# Patient Record
Sex: FEMALE | Race: WHITE | NOT HISPANIC OR LATINO | Employment: FULL TIME | URBAN - NONMETROPOLITAN AREA
[De-identification: names, ages, dates, MRNs, and addresses within clinical notes are randomized per-mention and may not be internally consistent; named-entity substitution may affect disease eponyms.]

---

## 2018-01-25 PROCEDURE — G0123 SCREEN CERV/VAG THIN LAYER: HCPCS | Performed by: OBSTETRICS & GYNECOLOGY

## 2018-01-26 ENCOUNTER — LAB REQUISITION (OUTPATIENT)
Dept: LAB | Facility: HOSPITAL | Age: 48
End: 2018-01-26

## 2018-01-26 DIAGNOSIS — Z12.72 ENCOUNTER FOR SCREENING FOR MALIGNANT NEOPLASM OF VAGINA: ICD-10-CM

## 2018-06-06 LAB
GEN CATEG CVX/VAG CYTO-IMP: NORMAL
LAB AP CASE REPORT: NORMAL
LAB AP GYN ADDITIONAL INFORMATION: NORMAL
LAB AP GYN OTHER FINDINGS: NORMAL
Lab: NORMAL
PATH INTERP SPEC-IMP: NORMAL
STAT OF ADQ CVX/VAG CYTO-IMP: NORMAL

## 2019-04-26 ENCOUNTER — PREP FOR SURGERY (OUTPATIENT)
Dept: OTHER | Facility: HOSPITAL | Age: 49
End: 2019-04-26

## 2019-04-26 DIAGNOSIS — N92.0 MENORRHAGIA WITH REGULAR CYCLE: Primary | ICD-10-CM

## 2019-04-26 RX ORDER — SODIUM CHLORIDE 0.9 % (FLUSH) 0.9 %
3-10 SYRINGE (ML) INJECTION AS NEEDED
Status: CANCELLED | OUTPATIENT
Start: 2019-04-26

## 2019-04-26 RX ORDER — SODIUM CHLORIDE 0.9 % (FLUSH) 0.9 %
3 SYRINGE (ML) INJECTION EVERY 12 HOURS SCHEDULED
Status: CANCELLED | OUTPATIENT
Start: 2019-04-26

## 2019-04-26 NOTE — H&P
History and Physical    Jazmin Najera  1970  [id number]    Chief complaint: abnormal menses. Menses are regular but are lasting 10+ days with clots and worsening dysmenorrhea    Subjective     Patient is a 49 y.o. female , last menstrual period was 2019, who presents with menorrhagia and abnormal ultrasound.    Her previous obstetric/gynecological history is noted for is non-contributory.         Allergies: NKA  Medication: nexium        Review of Systems   Constitutional: Negative.    HENT: Negative.    Eyes: Negative.    Respiratory: Negative.    Cardiovascular: Negative.    Gastrointestinal: Negative.    Endocrine: Negative.    Genitourinary: Positive for menstrual problem.   Musculoskeletal: Negative.    Skin: Negative.    Allergic/Immunologic: Negative.    Neurological: Negative.    Hematological: Negative.    Psychiatric/Behavioral: Negative.           Otherwise complete ROS reviewed and negative except as mentioned in the HPI.      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 and borderline AODM    Past Surgical History:  This patient has no significant past surgical history    Social History:  Marital Status:                                Smoker:  Never smoker                             Illicit Drugs:  Illicit drug use:  none                             Alcohol:  Alcohol use: none                              Family History:   Non-Contributory        Objective     Vital Signs Range for the last 24 hours  Temperature:98.6     Temp Source:oral     BP:126/78    Pulse:72    Respirations:18     SPO2:     O2 Amount (l/min):     O2 Devices     Weight:233         Physical Examination    General appearance:  alert, , well appearing, and in no distress, oriented to person, place, and time  Mental Status:  alert, oriented to person, place, and time  Eyes:  pupils equal and reactive to light  Ears:  bilateral TM's and external ear canals normal  Nose:  normal and patent, no erythema,  discharge or polyps  Mouth:  mucous membranes moist, pharynx normal without lesions  Neck:  supple, no significant adenopathy  Lymphatics:  no palpable lymphadenopathy, no hepatosplenomegaly  Chest:  clear to auscultation, no wheezes, rales or rhonchi, symmetric air entry  Heart::  normal rate, regular rhythm, normal S1, S2, no murmurs, rubs, clicks or gallops  Abdomen:  soft, nontender, nondistended, no masses or organomegaly  Breasts:  breasts appear normal, no suspicious masses, no skin or nipple changes or axillary nodes  Pelvic:  normal external genitalia, vulva, vagina, cervix, uterus and adnexa, UTERUS: uterus is normal size, shape, consistency and nontender, enlarged to 9 week's size, soft and boggy  Rectal:  normal rectal, no masses  Back exam:  full range of motion, no tenderness, palpable spasm or pain on motion  Neurological:  alert, oriented, normal speech, no focal findings or movement disorder noted  Musculoskeletal:  no joint tenderness, deformity or swelling  Extremities:   peripheral pulses normal, no pedal edema, no clubbing or cyanosis  Skin:  normal coloration and turgor, no rashes, no suspicious skin lesions noted          Laboratory Results: Reviewed  Radiology Review: Yes    Lab Results (last 72 hours)     ** No results found for the last 72 hours. **            Assessment/Plan       Assessment:  1.menorrhagia with an abnormal ulytrasound    Plan:  1. D&C        Amarjit Cardenas MD  4/26/2019  1:07 PM

## 2019-05-01 RX ORDER — OMEPRAZOLE 40 MG/1
40 CAPSULE, DELAYED RELEASE ORAL DAILY
COMMUNITY

## 2019-05-03 ENCOUNTER — ANESTHESIA (OUTPATIENT)
Dept: PERIOP | Facility: HOSPITAL | Age: 49
End: 2019-05-03

## 2019-05-03 ENCOUNTER — ANESTHESIA EVENT (OUTPATIENT)
Dept: PERIOP | Facility: HOSPITAL | Age: 49
End: 2019-05-03

## 2019-05-03 ENCOUNTER — HOSPITAL ENCOUNTER (OUTPATIENT)
Facility: HOSPITAL | Age: 49
Discharge: HOME | End: 2019-05-03
Attending: OBSTETRICS & GYNECOLOGY | Admitting: OBSTETRICS & GYNECOLOGY

## 2019-05-03 VITALS
HEART RATE: 63 BPM | SYSTOLIC BLOOD PRESSURE: 106 MMHG | BODY MASS INDEX: 39.71 KG/M2 | OXYGEN SATURATION: 97 % | DIASTOLIC BLOOD PRESSURE: 62 MMHG | RESPIRATION RATE: 16 BRPM | HEIGHT: 65 IN | WEIGHT: 238.32 LBS | TEMPERATURE: 97.9 F

## 2019-05-03 DIAGNOSIS — N92.0 MENORRHAGIA WITH REGULAR CYCLE: ICD-10-CM

## 2019-05-03 DIAGNOSIS — N92.0 MENORRHAGIA: ICD-10-CM

## 2019-05-03 PROBLEM — N84.0 UTERINE POLYP: Status: ACTIVE | Noted: 2019-05-03

## 2019-05-03 LAB — B-HCG UR QL: NEGATIVE

## 2019-05-03 PROCEDURE — 25010000002 DEXAMETHASONE PER 1 MG: Performed by: ANESTHESIOLOGY

## 2019-05-03 PROCEDURE — 25010000002 PROPOFOL 10 MG/ML EMULSION: Performed by: NURSE ANESTHETIST, CERTIFIED REGISTERED

## 2019-05-03 PROCEDURE — 25010000002 MIDAZOLAM PER 1 MG: Performed by: ANESTHESIOLOGY

## 2019-05-03 PROCEDURE — 25010000002 FENTANYL CITRATE (PF) 100 MCG/2ML SOLUTION: Performed by: NURSE ANESTHETIST, CERTIFIED REGISTERED

## 2019-05-03 PROCEDURE — 88305 TISSUE EXAM BY PATHOLOGIST: CPT | Performed by: OBSTETRICS & GYNECOLOGY

## 2019-05-03 PROCEDURE — 25010000002 DEXAMETHASONE PER 1 MG: Performed by: NURSE ANESTHETIST, CERTIFIED REGISTERED

## 2019-05-03 PROCEDURE — 25010000002 ONDANSETRON PER 1 MG: Performed by: NURSE ANESTHETIST, CERTIFIED REGISTERED

## 2019-05-03 PROCEDURE — 81025 URINE PREGNANCY TEST: CPT | Performed by: OBSTETRICS & GYNECOLOGY

## 2019-05-03 RX ORDER — ONDANSETRON 2 MG/ML
INJECTION INTRAMUSCULAR; INTRAVENOUS AS NEEDED
Status: DISCONTINUED | OUTPATIENT
Start: 2019-05-03 | End: 2019-05-03 | Stop reason: SURG

## 2019-05-03 RX ORDER — OXYCODONE AND ACETAMINOPHEN 10; 325 MG/1; MG/1
1 TABLET ORAL ONCE AS NEEDED
Status: COMPLETED | OUTPATIENT
Start: 2019-05-03 | End: 2019-05-03

## 2019-05-03 RX ORDER — PROPOFOL 10 MG/ML
VIAL (ML) INTRAVENOUS AS NEEDED
Status: DISCONTINUED | OUTPATIENT
Start: 2019-05-03 | End: 2019-05-03 | Stop reason: SURG

## 2019-05-03 RX ORDER — DEXTROSE MONOHYDRATE 25 G/50ML
12.5 INJECTION, SOLUTION INTRAVENOUS AS NEEDED
Status: DISCONTINUED | OUTPATIENT
Start: 2019-05-03 | End: 2019-05-03 | Stop reason: HOSPADM

## 2019-05-03 RX ORDER — LIDOCAINE HYDROCHLORIDE 20 MG/ML
INJECTION, SOLUTION INFILTRATION; PERINEURAL AS NEEDED
Status: DISCONTINUED | OUTPATIENT
Start: 2019-05-03 | End: 2019-05-03 | Stop reason: SURG

## 2019-05-03 RX ORDER — DEXAMETHASONE SODIUM PHOSPHATE 4 MG/ML
4 INJECTION, SOLUTION INTRA-ARTICULAR; INTRALESIONAL; INTRAMUSCULAR; INTRAVENOUS; SOFT TISSUE ONCE AS NEEDED
Status: COMPLETED | OUTPATIENT
Start: 2019-05-03 | End: 2019-05-03

## 2019-05-03 RX ORDER — SODIUM CHLORIDE 0.9 % (FLUSH) 0.9 %
3-10 SYRINGE (ML) INJECTION AS NEEDED
Status: DISCONTINUED | OUTPATIENT
Start: 2019-05-03 | End: 2019-05-03 | Stop reason: HOSPADM

## 2019-05-03 RX ORDER — FAMOTIDINE 10 MG/ML
20 INJECTION, SOLUTION INTRAVENOUS
Status: DISCONTINUED | OUTPATIENT
Start: 2019-05-03 | End: 2019-05-03 | Stop reason: HOSPADM

## 2019-05-03 RX ORDER — IBUPROFEN 600 MG/1
600 TABLET ORAL EVERY 6 HOURS PRN
Qty: 30 TABLET | Refills: 0 | Status: SHIPPED | OUTPATIENT
Start: 2019-05-03

## 2019-05-03 RX ORDER — MIDAZOLAM HYDROCHLORIDE 1 MG/ML
2 INJECTION INTRAMUSCULAR; INTRAVENOUS
Status: DISCONTINUED | OUTPATIENT
Start: 2019-05-03 | End: 2019-05-03 | Stop reason: HOSPADM

## 2019-05-03 RX ORDER — SODIUM CHLORIDE 0.9 % (FLUSH) 0.9 %
1-10 SYRINGE (ML) INJECTION AS NEEDED
Status: DISCONTINUED | OUTPATIENT
Start: 2019-05-03 | End: 2019-05-03 | Stop reason: HOSPADM

## 2019-05-03 RX ORDER — METOCLOPRAMIDE HYDROCHLORIDE 5 MG/ML
5 INJECTION INTRAMUSCULAR; INTRAVENOUS
Status: DISCONTINUED | OUTPATIENT
Start: 2019-05-03 | End: 2019-05-03 | Stop reason: HOSPADM

## 2019-05-03 RX ORDER — SCOLOPAMINE TRANSDERMAL SYSTEM 1 MG/1
1 PATCH, EXTENDED RELEASE TRANSDERMAL CONTINUOUS
Status: DISCONTINUED | OUTPATIENT
Start: 2019-05-03 | End: 2019-05-03 | Stop reason: HOSPADM

## 2019-05-03 RX ORDER — LABETALOL HYDROCHLORIDE 5 MG/ML
5 INJECTION, SOLUTION INTRAVENOUS
Status: DISCONTINUED | OUTPATIENT
Start: 2019-05-03 | End: 2019-05-03 | Stop reason: HOSPADM

## 2019-05-03 RX ORDER — MEPERIDINE HYDROCHLORIDE 25 MG/ML
12.5 INJECTION INTRAMUSCULAR; INTRAVENOUS; SUBCUTANEOUS
Status: DISCONTINUED | OUTPATIENT
Start: 2019-05-03 | End: 2019-05-03 | Stop reason: HOSPADM

## 2019-05-03 RX ORDER — DEXAMETHASONE SODIUM PHOSPHATE 4 MG/ML
INJECTION, SOLUTION INTRA-ARTICULAR; INTRALESIONAL; INTRAMUSCULAR; INTRAVENOUS; SOFT TISSUE AS NEEDED
Status: DISCONTINUED | OUTPATIENT
Start: 2019-05-03 | End: 2019-05-03 | Stop reason: SURG

## 2019-05-03 RX ORDER — IBUPROFEN 600 MG/1
600 TABLET ORAL ONCE AS NEEDED
Status: DISCONTINUED | OUTPATIENT
Start: 2019-05-03 | End: 2019-05-03 | Stop reason: HOSPADM

## 2019-05-03 RX ORDER — SODIUM CHLORIDE 0.9 % (FLUSH) 0.9 %
3 SYRINGE (ML) INJECTION EVERY 12 HOURS SCHEDULED
Status: DISCONTINUED | OUTPATIENT
Start: 2019-05-03 | End: 2019-05-03 | Stop reason: HOSPADM

## 2019-05-03 RX ORDER — ONDANSETRON 2 MG/ML
4 INJECTION INTRAMUSCULAR; INTRAVENOUS ONCE AS NEEDED
Status: DISCONTINUED | OUTPATIENT
Start: 2019-05-03 | End: 2019-05-03 | Stop reason: HOSPADM

## 2019-05-03 RX ORDER — FENTANYL CITRATE 50 UG/ML
25 INJECTION, SOLUTION INTRAMUSCULAR; INTRAVENOUS
Status: DISCONTINUED | OUTPATIENT
Start: 2019-05-03 | End: 2019-05-03 | Stop reason: HOSPADM

## 2019-05-03 RX ORDER — MIDAZOLAM HYDROCHLORIDE 1 MG/ML
1 INJECTION INTRAMUSCULAR; INTRAVENOUS
Status: DISCONTINUED | OUTPATIENT
Start: 2019-05-03 | End: 2019-05-03 | Stop reason: HOSPADM

## 2019-05-03 RX ORDER — IPRATROPIUM BROMIDE AND ALBUTEROL SULFATE 2.5; .5 MG/3ML; MG/3ML
3 SOLUTION RESPIRATORY (INHALATION) ONCE AS NEEDED
Status: DISCONTINUED | OUTPATIENT
Start: 2019-05-03 | End: 2019-05-03 | Stop reason: HOSPADM

## 2019-05-03 RX ORDER — FENTANYL CITRATE 50 UG/ML
25 INJECTION, SOLUTION INTRAMUSCULAR; INTRAVENOUS AS NEEDED
Status: DISCONTINUED | OUTPATIENT
Start: 2019-05-03 | End: 2019-05-03 | Stop reason: HOSPADM

## 2019-05-03 RX ORDER — SODIUM CHLORIDE, SODIUM LACTATE, POTASSIUM CHLORIDE, CALCIUM CHLORIDE 600; 310; 30; 20 MG/100ML; MG/100ML; MG/100ML; MG/100ML
1000 INJECTION, SOLUTION INTRAVENOUS CONTINUOUS
Status: DISCONTINUED | OUTPATIENT
Start: 2019-05-03 | End: 2019-05-03 | Stop reason: HOSPADM

## 2019-05-03 RX ORDER — FENTANYL CITRATE 50 UG/ML
INJECTION, SOLUTION INTRAMUSCULAR; INTRAVENOUS AS NEEDED
Status: DISCONTINUED | OUTPATIENT
Start: 2019-05-03 | End: 2019-05-03 | Stop reason: SURG

## 2019-05-03 RX ORDER — SODIUM CHLORIDE, SODIUM LACTATE, POTASSIUM CHLORIDE, CALCIUM CHLORIDE 600; 310; 30; 20 MG/100ML; MG/100ML; MG/100ML; MG/100ML
9 INJECTION, SOLUTION INTRAVENOUS CONTINUOUS
Status: DISCONTINUED | OUTPATIENT
Start: 2019-05-03 | End: 2019-05-03 | Stop reason: HOSPADM

## 2019-05-03 RX ORDER — OXYCODONE AND ACETAMINOPHEN 7.5; 325 MG/1; MG/1
2 TABLET ORAL EVERY 4 HOURS PRN
Status: DISCONTINUED | OUTPATIENT
Start: 2019-05-03 | End: 2019-05-03 | Stop reason: HOSPADM

## 2019-05-03 RX ORDER — NALOXONE HCL 0.4 MG/ML
0.4 VIAL (ML) INJECTION AS NEEDED
Status: DISCONTINUED | OUTPATIENT
Start: 2019-05-03 | End: 2019-05-03 | Stop reason: HOSPADM

## 2019-05-03 RX ADMIN — PROPOFOL 300 MG: 10 INJECTION, EMULSION INTRAVENOUS at 07:07

## 2019-05-03 RX ADMIN — SODIUM CHLORIDE, POTASSIUM CHLORIDE, SODIUM LACTATE AND CALCIUM CHLORIDE 1000 ML: 600; 310; 30; 20 INJECTION, SOLUTION INTRAVENOUS at 06:02

## 2019-05-03 RX ADMIN — LIDOCAINE HYDROCHLORIDE 100 MG: 20 INJECTION, SOLUTION INFILTRATION; PERINEURAL at 07:07

## 2019-05-03 RX ADMIN — DEXAMETHASONE SODIUM PHOSPHATE 4 MG: 4 INJECTION, SOLUTION INTRAMUSCULAR; INTRAVENOUS at 07:15

## 2019-05-03 RX ADMIN — DEXAMETHASONE SODIUM PHOSPHATE 4 MG: 4 INJECTION, SOLUTION INTRAMUSCULAR; INTRAVENOUS at 06:36

## 2019-05-03 RX ADMIN — FAMOTIDINE 20 MG: 10 INJECTION, SOLUTION INTRAVENOUS at 06:36

## 2019-05-03 RX ADMIN — ONDANSETRON HYDROCHLORIDE 4 MG: 2 SOLUTION INTRAMUSCULAR; INTRAVENOUS at 07:15

## 2019-05-03 RX ADMIN — SCOPALAMINE 1 PATCH: 1 PATCH, EXTENDED RELEASE TRANSDERMAL at 06:36

## 2019-05-03 RX ADMIN — FENTANYL CITRATE 100 MCG: 50 INJECTION, SOLUTION INTRAMUSCULAR; INTRAVENOUS at 07:08

## 2019-05-03 RX ADMIN — OXYCODONE HYDROCHLORIDE AND ACETAMINOPHEN 1 TABLET: 10; 325 TABLET ORAL at 07:54

## 2019-05-03 RX ADMIN — PROPOFOL 100 MG: 10 INJECTION, EMULSION INTRAVENOUS at 07:09

## 2019-05-03 RX ADMIN — MIDAZOLAM 2 MG: 1 INJECTION INTRAMUSCULAR; INTRAVENOUS at 06:43

## 2019-05-03 NOTE — ANESTHESIA PREPROCEDURE EVALUATION
Anesthesia Evaluation     Patient summary reviewed   NPO Solid Status: > 8 hours             Airway   Mallampati: II  TM distance: >3 FB  Neck ROM: full  Dental      Pulmonary    (-) COPD, asthma, sleep apnea, not a smoker  Cardiovascular   Exercise tolerance: excellent (>7 METS)    (-) pacemaker, past MI, angina, cardiac stents      Neuro/Psych  (-) seizures, TIA, CVA  GI/Hepatic/Renal/Endo    (+) morbid obesity, GERD,    (-) liver disease, no renal disease, diabetes    Musculoskeletal     Abdominal    Substance History      OB/GYN          Other                        Anesthesia Plan    ASA 3     general     intravenous induction   Anesthetic plan, all risks, benefits, and alternatives have been provided, discussed and informed consent has been obtained with: patient.

## 2019-05-03 NOTE — ANESTHESIA POSTPROCEDURE EVALUATION
"Patient: Jazmin Najera    Procedure Summary     Date:  05/03/19 Room / Location:   [facility]  /  [facility]    Anesthesia Start:  0706 Anesthesia Stop:  0737    Procedure:  DILATION  AND CURETTAGE (N/A Vagina) Diagnosis:  (MENORRHAGIA)    Surgeon:  Amarjit Cardenas MD Provider:  JOURDAN Quinn CRNA    Anesthesia Type:  general ASA Status:  3          Anesthesia Type: general  Last vitals  BP   109/71 (05/03/19 0815)   Temp   97.9 °F (36.6 °C) (05/03/19 0800)   Pulse   69 (05/03/19 0815)   Resp   16 (05/03/19 0815)     SpO2   99 % (05/03/19 0815)     Post Anesthesia Care and Evaluation    Patient location during evaluation: PACU  Patient participation: complete - patient participated  Level of consciousness: awake and alert  Pain management: adequate  Airway patency: patent  Anesthetic complications: No anesthetic complications  PONV Status: none  Cardiovascular status: acceptable and hemodynamically stable  Respiratory status: acceptable  Hydration status: acceptable    Comments: Blood pressure 109/71, pulse 69, temperature 97.9 °F (36.6 °C), resp. rate 16, height 165 cm (64.96\"), weight 108 kg (238 lb 5.1 oz), last menstrual period 04/08/2019, SpO2 99 %, not currently breastfeeding.    Patient discharged from PACU based upon Ivonne score. Please see RN notes for further details      "

## 2019-05-03 NOTE — ANESTHESIA PROCEDURE NOTES
Airway  Urgency: elective    Airway not difficult    General Information and Staff    Patient location during procedure: OR  CRNA: JOURDAN Quinn CRNA    Indications and Patient Condition  Indications for airway management: airway protection    Preoxygenated: yes  MILS maintained throughout  Mask difficulty assessment: 1 - vent by mask    Final Airway Details  Final airway type: endotracheal airway      Successful airway: ETT  Cuffed: yes   Successful intubation technique: direct laryngoscopy  Facilitating devices/methods: intubating stylet  Endotracheal tube insertion site: oral  Blade: Siegel  Blade size: 3  ETT size (mm): 7.5  Cormack-Lehane Classification: grade I - full view of glottis  Placement verified by: chest auscultation and capnometry   Cuff volume (mL): 5  Measured from: lips  ETT to lips (cm): 21  Number of attempts at approach: 1

## 2019-05-03 NOTE — OP NOTE
Monique Najera  : 1970  MRN: [id number]  CSN: [id number]  Date: 5/3/2019    Operative Note    DILATATION AND CURETTAGE      Pre-op Diagnosis:  MENORRHAGIA   Post-op Diagnosis:  Same pending pathology.    Procedure: Procedure(s):  DILATION  AND CURETTAGE   Surgeon: Surgeon(s):  Amarjit Cardenas MD      Anesthesia: General.     Description of Procedure: The patient was taken to the operating room table and placed in the supine position. After satisfactory level of general anesthesia was obtained, the patient was placed in the dorsal lithotomy position, prepped and draped in a sterile manner. The patient was catheterized. Examination under anesthesia was performed. Exam revealed excellent descensus with a 2nd-3rd degree rectocele present. A weighted speculum was introduced into the vaginal vault. The cervix was grasped with a single-tooth tenaculum, sounded to 8 cm, and dilated to a #6 with Hegar dilators.A fractional curettage was then performed.Curettings of the endocervix reveiled a scant amount of normal appearing tissue. The uterus was then curettaged. Curettings revealed a moderate amount of normal-appearing endometrial tissue. A polyp was identified and removed. A small uterine septum was also noted, both uterine horns were curetted.  Ky stone polyp forceps was used to remove the rest of the tissue.  Tenaculum sites were noted to be dry. Vagina and cervix was inspected and noted to be hemostatically dry. Weighted speculum was removed. The patient was replaced in the supine position. The procedure was terminated. At termination of the case, pad and lap counts were correct. No Plaza drains or packs left in. The patient was taken to recovery room awake and in stable condition.      Estimated Blood Loss: 10  Replacement: none   Findings: moderate amount of endometrial tissue, a uterine septum, and an endometrial polyp.    Amarjit Cardenas MD  5/3/2019  7:30 AM

## 2019-05-03 NOTE — DISCHARGE INSTRUCTIONS

## 2019-05-09 LAB
CYTO UR: NORMAL
LAB AP CASE REPORT: NORMAL
PATH REPORT.FINAL DX SPEC: NORMAL
PATH REPORT.GROSS SPEC: NORMAL

## 2021-11-23 ENCOUNTER — OFFICE VISIT (OUTPATIENT)
Dept: OBSTETRICS AND GYNECOLOGY | Facility: CLINIC | Age: 51
End: 2021-11-23

## 2021-11-23 VITALS
BODY MASS INDEX: 38.89 KG/M2 | DIASTOLIC BLOOD PRESSURE: 84 MMHG | SYSTOLIC BLOOD PRESSURE: 138 MMHG | WEIGHT: 242 LBS | HEIGHT: 66 IN

## 2021-11-23 DIAGNOSIS — Z12.31 ENCOUNTER FOR SCREENING MAMMOGRAM FOR MALIGNANT NEOPLASM OF BREAST: ICD-10-CM

## 2021-11-23 DIAGNOSIS — Z01.419 ENCOUNTER FOR GYNECOLOGICAL EXAMINATION WITHOUT ABNORMAL FINDING: Primary | ICD-10-CM

## 2021-11-23 DIAGNOSIS — E66.9 OBESITY (BMI 30-39.9): ICD-10-CM

## 2021-11-23 DIAGNOSIS — N95.1 PERIMENOPAUSAL: ICD-10-CM

## 2021-11-23 PROCEDURE — 99386 PREV VISIT NEW AGE 40-64: CPT | Performed by: NURSE PRACTITIONER

## 2021-11-23 PROCEDURE — 87624 HPV HI-RISK TYP POOLED RSLT: CPT | Performed by: NURSE PRACTITIONER

## 2021-11-23 PROCEDURE — G0123 SCREEN CERV/VAG THIN LAYER: HCPCS | Performed by: NURSE PRACTITIONER

## 2021-11-23 PROCEDURE — 87625 HPV TYPES 16 & 18 ONLY: CPT | Performed by: NURSE PRACTITIONER

## 2021-11-24 ENCOUNTER — PATIENT ROUNDING (BHMG ONLY) (OUTPATIENT)
Dept: OBSTETRICS AND GYNECOLOGY | Facility: CLINIC | Age: 51
End: 2021-11-24

## 2021-11-24 NOTE — PROGRESS NOTES
November 24, 2021    Hello, may I speak with Jazmin Denisjesse?    My name is Peggy    I am  with [facility]  [address], [address]  [facility] with [facility] [id number] [phone].    Before we get started may I verify your date of birth? 1970    I am calling to officially welcome you to our practice and ask about your recent visit. Is this a good time to talk? yes    Tell me about your visit with us. What things went well?  great not problems       We're always looking for ways to make our patients' experiences even better. Do you have recommendations on ways we may improve?  no    Overall were you satisfied with your first visit to our practice? yes       I appreciate you taking the time to speak with me today. Is there anything else I can do for you? no      Thank you, and have a great day.

## 2021-11-29 LAB
GEN CATEG CVX/VAG CYTO-IMP: NORMAL
HPV I/H RISK 4 DNA CVX QL PROBE+SIG AMP: DETECTED
HPV16 DNA SPEC QL NAA+PROBE: NOT DETECTED
HPV18+45 E6+E7 MRNA CVX QL NAA+PROBE: NOT DETECTED
LAB AP CASE REPORT: NORMAL
LAB AP GYN ADDITIONAL INFORMATION: NORMAL
LAB AP GYN OTHER FINDINGS: NORMAL
PATH INTERP SPEC-IMP: NORMAL
STAT OF ADQ CVX/VAG CYTO-IMP: NORMAL

## 2025-03-05 ENCOUNTER — OFFICE VISIT (OUTPATIENT)
Age: 55
End: 2025-03-05
Payer: COMMERCIAL

## 2025-03-05 VITALS
SYSTOLIC BLOOD PRESSURE: 119 MMHG | TEMPERATURE: 98.6 F | RESPIRATION RATE: 17 BRPM | OXYGEN SATURATION: 97 % | DIASTOLIC BLOOD PRESSURE: 84 MMHG | BODY MASS INDEX: 33.46 KG/M2 | HEART RATE: 84 BPM | HEIGHT: 66 IN | WEIGHT: 208.2 LBS

## 2025-03-05 DIAGNOSIS — Z12.4 SCREENING FOR CERVICAL CANCER: ICD-10-CM

## 2025-03-05 DIAGNOSIS — Z01.419 ENCOUNTER FOR ANNUAL ROUTINE GYNECOLOGICAL EXAMINATION: Primary | ICD-10-CM

## 2025-03-05 DIAGNOSIS — Z79.890 HORMONE REPLACEMENT THERAPY (HRT): ICD-10-CM

## 2025-03-05 PROBLEM — E11.9 DIABETES MELLITUS, TYPE 2: Status: ACTIVE | Noted: 2025-03-05

## 2025-03-05 PROBLEM — N92.0 MENORRHAGIA WITH REGULAR CYCLE: Status: RESOLVED | Noted: 2019-05-03 | Resolved: 2025-03-05

## 2025-03-05 PROBLEM — N84.0 UTERINE POLYP: Status: RESOLVED | Noted: 2019-05-03 | Resolved: 2025-03-05

## 2025-03-05 RX ORDER — TIRZEPATIDE 5 MG/.5ML
5 INJECTION, SOLUTION SUBCUTANEOUS WEEKLY
COMMUNITY
Start: 2024-10-27

## 2025-03-05 RX ORDER — PROGESTERONE 100 MG/1
100 CAPSULE ORAL DAILY
COMMUNITY
Start: 2024-08-27

## 2025-03-05 RX ORDER — METFORMIN HYDROCHLORIDE 500 MG/1
500 TABLET, EXTENDED RELEASE ORAL 2 TIMES DAILY
COMMUNITY
Start: 2024-08-27

## 2025-03-05 RX ORDER — ESTRADIOL 0.5 MG/1
TABLET ORAL
COMMUNITY
Start: 2024-08-27 | End: 2025-03-05 | Stop reason: ALTCHOICE

## 2025-03-05 RX ORDER — ESTRADIOL 0.05 MG/D
1 PATCH, EXTENDED RELEASE TRANSDERMAL 2 TIMES WEEKLY
Qty: 24 PATCH | Refills: 3 | Status: SHIPPED | OUTPATIENT
Start: 2025-03-06

## 2025-03-05 NOTE — PROGRESS NOTES
Subjective   Chief Complaint   Patient presents with    Gynecologic Exam     Patient here today for annual exam. Last Pap 11/23/2021 was negative for intraepithelial lesions and HPV positive. Last Mammo performed on February 2025 at [facility] and per patient it was normal. Per patient colonoscopy performed on December 2023 and it was normal      Patient states she is feeling well today, no pain, no discomfort, no vaginal discharge.        STEWART Najera is a 55 y.o. female.      History of Present Illness  The patient presents for a routine annual gynecologic exam.    She has not experienced a menstrual cycle since 07/2024. She reports experiencing hot flashes and night sweats, which were severe enough to disrupt her sleep. She was prescribed low-dose estradiol 0.5 mg and Prometrium 100 mg by her primary care physician, Dr. Reed, at [facility] towards the end of August. This treatment has significantly improved her symptoms, reducing the frequency of hot flashes and eliminating nighttime awakenings due to sweating. She typically wakes up once per night to use the bathroom. She recalls being offered various options for hormone therapy by Dr. Reed but does not remember discussing the pros and cons of oral versus transdermal administration.     She has a history of type 2 diabetes and is currently on Mounjaro injections and metformin 500 mg twice daily. She has lost approximately 20 pounds over a 5-month period since starting Mounjaro. She is mindful of her diet and is increasing her physical activity. She reports no side effects from Mounjaro.    She has a history of thyroid disease, initially suspected to be autoimmune in nature when her TSH level was undetectable. She was started on methimazole by Dr. Reed and took it for about a year. After re-evaluation, she was advised to discontinue the medication as her thyroid function had normalized. She underwent a nuclear scan to rule out thyroid nodules and has  been off methimazole for almost a year.    She is also on omeprazole for acid reflux. She and her sister both had endoscopies after their father passed away from esophageal cancer. Her sister, who is 8 years older, was advised to start a medication regimen, and it was recommended that she do the same, even though she was much younger at the time.    She had a mammogram recently at [facility] and received a letter from the radiology department indicating that everything was normal. She did not receive the actual report.   Her last Pap smear was in [place] in . The Pap was normal but the HPV test was positive for high risk HPV.  She was advised to have repeat co-testing done in one year, but hasn't followed up since then.  She acknowledges being a little behind on her screenings due to a job change and her mother's illness. She has not had any major abnormal Pap smears, and if there was one, it was a long time ago, possibly before she had her children.     SOCIAL HISTORY  She does not smoke or drink alcohol. 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    FAMILY HISTORY  Her mother had breast cancer at age 47 and has diabetes. Her father had esophageal cancer and passed away at age 62. Both of her grandfathers had heart disease. Her sister has no medical conditions.    IMMUNIZATIONS  She has not received the shingles vaccine.    ROS  Review of relevant systems completed and is as noted in the HPI.    PAST OB/GYN HISTORY:   OB History    Para Term  AB Living   3 2 2   1 2   SAB IAB Ectopic Molar Multiple Live Births   1         2      # Outcome Date GA Lbr Vahe/2nd Weight Sex Type Anes PTL Lv   3 Term            2 Term            1 SAB                 PAST MEDICAL HISTORY:   Past Medical History:   Diagnosis Date    Diabetes mellitus     Disease of thyroid gland     took methimazole x 1yr, had nuclear med scan, labs normalized.  Off meds since 2024    GERD (gastroesophageal  "reflux disease)     HPV (human papilloma virus) infection     Varicella     childhood        PAST SURGICAL HISTORY:   Past Surgical History:   Procedure Laterality Date    BILATERAL BREAST REDUCTION  2009     SECTION       SECTION      CHOLECYSTECTOMY      DILATATION AND CURETTAGE      x3     DILATATION AND CURETTAGE N/A 5/3/2019    Procedure: DILATION  AND CURETTAGE;  Surgeon: Amarjit Cardenas MD;  Location: [facility] OR;  Service: Obstetrics/Gynecology        SOCIAL HISTORY:   Social History     Socioeconomic History    Marital status:    Tobacco Use    Smoking status: Never    Smokeless tobacco: Never   Vaping Use    Vaping status: Never Used   Substance and Sexual Activity    Alcohol use: No    Drug use: No    Sexual activity: Yes     Partners: Male     Birth control/protection: Natural family planning/Rhythm        FAMILY HISTORY:   Family History   Problem Relation Age of Onset    Breast cancer Mother 47    Diabetes Mother     Esophageal cancer Father 62    Coronary artery disease Maternal Grandfather     Coronary artery disease Paternal Grandfather         MEDICATIONS AND ALLERGIES ON CHART AND REVIEWED.      Objective   Physical Exam  Vitals:    25 1120   BP: 119/84   BP Location: Right arm   Patient Position: Sitting   Cuff Size: Adult   Pulse: 84   Resp: 17   Temp: 98.6 °F (37 °C)   TempSrc: Infrared   SpO2: 97%   Weight: 94.4 kg (208 lb 3.2 oz)   Height: 167.6 cm (65.98\")      Body mass index is 33.62 kg/m².    General:  Alert and oriented x3, no acute distress  HEENT:  Normocephalic, atraumatic.  Mucous membranes moist and pink.  Neck:  Supple.  No adenopathy, no thyromegaly.  Lungs:  Clear to auscultation bilaterally.  No rales or ronchi.  Normal respiratory effort.  Heart:  Regular rate and rhythm.  S1, S2 normal.  No murmurs.  Breast:  No masses, nipple discharge or skin change bilaterally.  Nontender.  Breast reduction surgical scars present bilaterally.  breast " self-exam/awareness reviewed with patient.  Abdomen:  Soft, nontender, nondistended, normal bowel sounds.  No organomegaly or masses.  Exam is limited by body habitus.  Extremeties:  No cyanosis, clubbing or edema.  Back:  No CVA tenderness  Skin:  Warm and dry.  No rashes or suspicious lesions noted.  Pelvic exam:  External/vulva:  Normal external female genitalia.  No lesions.  Bladder/urethra:  Meatus with normal appearance.  No prolapse.  No urethral or bladder masses palpable.  Vagina:  Normal rugae.  No lesions, no discharge.  Cervix:  No lesions.  No cervical motion tenderness.  Pap smear performed  Uterus:  Average size, mid position, mobile, nontender.  Adnexa:  Nontender bilaterally without masses.  Examination limited by habitus.  Anus/perineum:  Normal appearance of skin.  No pigment changes, no lesions.  Procedures  N/a    Assessment & Plan        Diagnoses and all orders for this visit:    1. Encounter for annual routine gynecological examination (Primary)    2. Screening for cervical cancer  -     IGP, Apt HPV,rfx 16 / 18,45    3. Hormone replacement therapy (HRT)  -     estradiol (Nikki) 0.05 MG/24HR patch; Place 1 patch on the skin as directed by provider 2 (Two) Times a Week.  Dispense: 24 patch; Refill: 3             Assessment & Plan  1. Health Maintenance.  She recently had a normal mammogram. Her last Pap smear in 2021 was normal, but she tested positive for HPV. A Pap smear and HPV test will be conducted today. If the HPV test returns positive, a colposcopy will be scheduled.  She was advised to receive the shingles vaccine, which can be administered either at this clinic or at a pharmacy like [facility].   She was also informed about the option of genetic testing for breast cancer, given her family history, and a referral can be made if she expresses interest.     2. Menopause.  She is approaching menopause, with her last menstrual period occurring on 07/01/2024. She has been experiencing hot  flashes and night sweats, which have significantly improved with her current hormone therapy regimen of estradiol 0.5 mg and Prometrium 100 mg.    We discussed the risks and benefits of menopausal hormone therapy including a full review of the WHI study and the very slight increase in risk of breast cancer, stroke, heart attack and DVT with hormone therapy for the women in that study.  We also discussed how the average age for the women in that study was in their 60s and that further research since then has not supported the same findings for younger women who are within 10 years of their last menstrual period.  We discussed the fact that for women with a uterus progesterone is needed along with estrogen therapy for endometrial protection.  We discussed dosage and route of administration for hormone therapy including transdermal and oral.  We discussed the more even blood levels and decreased DVT risk with transdermal estradiol.  She was informed that the duration of hormone therapy is individualized. She will continue her current dose of Prometrium 100 mg. A prescription for an estradiol patch 0.05 mg was provided, with instructions to apply it twice weekly to clean, dry skin on the upper gluteal area or lower abdomen, rotating the application site each time. She was advised to monitor for any skin reactions to the adhesive.  She will discontinue the oral estradiol.        Return in about 1 year (around 3/5/2026) for Annual physical.    Patient or patient representative verbalized consent for the use of Ambient Listening during the visit with  Kristen Batista MD for chart documentation. 3/5/2025  12:33 CST    Kristen Batista MD  [facility] [place]

## 2025-03-09 LAB
CYTOLOGIST CVX/VAG CYTO: NORMAL
CYTOLOGY CVX/VAG DOC CYTO: NORMAL
CYTOLOGY CVX/VAG DOC THIN PREP: NORMAL
DX ICD CODE: NORMAL
HPV I/H RISK 4 DNA CVX QL PROBE+SIG AMP: NEGATIVE
OTHER STN SPEC: NORMAL
SERVICE CMNT-IMP: NORMAL
STAT OF ADQ CVX/VAG CYTO-IMP: NORMAL

## 2025-03-11 ENCOUNTER — RESULTS FOLLOW-UP (OUTPATIENT)
Age: 55
End: 2025-03-11
Payer: COMMERCIAL

## 2025-03-11 NOTE — TELEPHONE ENCOUNTER
Called patient and relayed message regarding recent lab results. Patient voiced understanding and no questions/concerns were voiced at this time

## (undated) DEVICE — PAD D&C: Brand: MEDLINE INDUSTRIES, INC.

## (undated) DEVICE — GLV SURG BIOGEL M LTX PF 7

## (undated) DEVICE — PK TURNOVER RM ADV